# Patient Record
Sex: FEMALE | Race: WHITE | URBAN - METROPOLITAN AREA
[De-identification: names, ages, dates, MRNs, and addresses within clinical notes are randomized per-mention and may not be internally consistent; named-entity substitution may affect disease eponyms.]

---

## 2017-03-13 ENCOUNTER — COMMUNICATION - HEALTHEAST (OUTPATIENT)
Dept: TELEHEALTH | Facility: CLINIC | Age: 62
End: 2017-03-13

## 2017-03-13 ENCOUNTER — OFFICE VISIT - HEALTHEAST (OUTPATIENT)
Dept: FAMILY MEDICINE | Facility: CLINIC | Age: 62
End: 2017-03-13

## 2017-03-13 DIAGNOSIS — R10.9 RIGHT SIDED ABDOMINAL PAIN: ICD-10-CM

## 2017-03-13 DIAGNOSIS — E03.9 HYPOTHYROIDISM: ICD-10-CM

## 2017-03-13 DIAGNOSIS — Z13.9 SCREENING: ICD-10-CM

## 2017-03-13 RX ORDER — BETAMETHASONE DIPROPIONATE 0.5 MG/G
OINTMENT, AUGMENTED TOPICAL 2 TIMES DAILY
Status: SHIPPED | COMMUNITY
Start: 2017-03-13

## 2017-03-13 RX ORDER — CALCIPOTRIENE 50 UG/G
OINTMENT TOPICAL 2 TIMES DAILY
Status: SHIPPED | COMMUNITY
Start: 2017-03-13

## 2017-03-13 ASSESSMENT — MIFFLIN-ST. JEOR: SCORE: 1180.47

## 2017-03-14 ENCOUNTER — COMMUNICATION - HEALTHEAST (OUTPATIENT)
Dept: FAMILY MEDICINE | Facility: CLINIC | Age: 62
End: 2017-03-14

## 2017-04-20 ENCOUNTER — OFFICE VISIT - HEALTHEAST (OUTPATIENT)
Dept: FAMILY MEDICINE | Facility: CLINIC | Age: 62
End: 2017-04-20

## 2017-04-20 ENCOUNTER — COMMUNICATION - HEALTHEAST (OUTPATIENT)
Dept: SCHEDULING | Facility: CLINIC | Age: 62
End: 2017-04-20

## 2017-04-20 DIAGNOSIS — R10.31 RIGHT LOWER QUADRANT ABDOMINAL PAIN: ICD-10-CM

## 2017-04-20 DIAGNOSIS — E03.9 HYPOTHYROIDISM: ICD-10-CM

## 2017-04-20 ASSESSMENT — MIFFLIN-ST. JEOR: SCORE: 1165.05

## 2018-01-02 ENCOUNTER — OFFICE VISIT - HEALTHEAST (OUTPATIENT)
Dept: FAMILY MEDICINE | Facility: CLINIC | Age: 63
End: 2018-01-02

## 2018-01-02 DIAGNOSIS — J32.9 SINUS INFECTION: ICD-10-CM

## 2018-01-02 ASSESSMENT — MIFFLIN-ST. JEOR: SCORE: 1106.99

## 2018-04-10 ENCOUNTER — OFFICE VISIT - HEALTHEAST (OUTPATIENT)
Dept: FAMILY MEDICINE | Facility: CLINIC | Age: 63
End: 2018-04-10

## 2018-04-10 DIAGNOSIS — Z00.00 ANNUAL PHYSICAL EXAM: ICD-10-CM

## 2018-04-10 DIAGNOSIS — E03.9 HYPOTHYROIDISM: ICD-10-CM

## 2018-04-10 DIAGNOSIS — Z00.00 ROUTINE GENERAL MEDICAL EXAMINATION AT A HEALTH CARE FACILITY: ICD-10-CM

## 2018-04-10 LAB
ANION GAP SERPL CALCULATED.3IONS-SCNC: 8 MMOL/L (ref 5–18)
BUN SERPL-MCNC: 12 MG/DL (ref 8–22)
CALCIUM SERPL-MCNC: 9.4 MG/DL (ref 8.5–10.5)
CHLORIDE BLD-SCNC: 103 MMOL/L (ref 98–107)
CO2 SERPL-SCNC: 29 MMOL/L (ref 22–31)
CREAT SERPL-MCNC: 0.78 MG/DL (ref 0.6–1.1)
GFR SERPL CREATININE-BSD FRML MDRD: >60 ML/MIN/1.73M2
GLUCOSE BLD-MCNC: 99 MG/DL (ref 70–125)
HGB BLD-MCNC: 14.2 G/DL (ref 12–16)
POTASSIUM BLD-SCNC: 4.1 MMOL/L (ref 3.5–5)
SODIUM SERPL-SCNC: 140 MMOL/L (ref 136–145)
TSH SERPL DL<=0.005 MIU/L-ACNC: 0.59 UIU/ML (ref 0.3–5)

## 2018-04-10 RX ORDER — LEVOTHYROXINE SODIUM 100 UG/1
100 TABLET ORAL DAILY
Qty: 90 TABLET | Refills: 3 | Status: SHIPPED | OUTPATIENT
Start: 2018-04-10

## 2018-04-10 ASSESSMENT — MIFFLIN-ST. JEOR: SCORE: 1136.93

## 2018-04-11 ENCOUNTER — COMMUNICATION - HEALTHEAST (OUTPATIENT)
Dept: FAMILY MEDICINE | Facility: CLINIC | Age: 63
End: 2018-04-11

## 2021-05-30 VITALS — WEIGHT: 142.4 LBS | BODY MASS INDEX: 25.23 KG/M2 | HEIGHT: 63 IN

## 2021-05-30 VITALS — BODY MASS INDEX: 25.83 KG/M2 | WEIGHT: 145.8 LBS | HEIGHT: 63 IN

## 2021-05-31 VITALS — BODY MASS INDEX: 22.96 KG/M2 | WEIGHT: 129.6 LBS | HEIGHT: 63 IN

## 2021-06-01 VITALS — BODY MASS INDEX: 24.13 KG/M2 | WEIGHT: 136.2 LBS | HEIGHT: 63 IN

## 2021-06-09 NOTE — PROGRESS NOTES
Office Visit - New Patient   Collette J Swanson   61 y.o.  female    Date of visit: 3/13/2017  Physician: Kandy Major CNP     Assessment and Plan   1. Hypothyroidism  Plan check TSH today and adjust levothyroxine dose.  - levothyroxine (SYNTHROID, LEVOTHROID) 100 MCG tablet; Take 1 tablet (100 mcg total) by mouth daily.  Dispense: 90 tablet; Refill: 3  - Thyroid Cascade  - T4, Free    Addend: Start taking levothyroxine 88 mcg daily for the next 6 weeks and recheck TSH.   Spoke with patient and we agreed to continue with the 100 mcg for another 6 weeks and recheck TSH.    2. Screening  Patient is not up to date on her health maintenance. Complete referral for both colonoscopy and mammogram.  - Ambulatory referral for Colonoscopy  - Mammo Screening Bilateral; Future    3. Right sided abdominal pain  Patient currently has no abdominal pain and she has not had this pain for several months. I encourage her to keep diary of her pain and if experiences pain again she should called the clinic for a possible appointment.       The following high BMI interventions were performed this visit: encouragement to exercise and dietary management education, guidance, and counseling    No Follow-up on file.     Chief Complaint   Chief Complaint   Patient presents with     Establish Care     Medication Refill        Patient Profile   Social History     Social History Narrative        Past Medical History   Patient Active Problem List   Diagnosis     Hypothyroidism     Overweight     Hypertension     Gestational Diabetes Mellitus     Psoriasis     Ankle Swelling (On Exam)       Past Surgical History  She has a past surgical history that includes remove tonsils/adenoids,<12 y/o and removal gallbladder.     History of Present Illness   This 61 y.o. old female patient who presents to the clinic to establish care. She was previously seen at health [facility] but due to insurance change she needed to reestablish care with a new PCP.  "Patient has history of hypertension, lower extremity edema and hypothyroidism. She reports that her blood pressure is well controlled with her current medication. Patient reports that she has also had surgery to remove her gallbladder and about a year after her surgery she started experiencing right abdominal pain that is relieved with ibuprofen 400 mg. She states that she did talk to her previous provider about the pain and she informed her that it could be from tissue adhesion post her Cholecystectomy. She has not had any workup for this pain and she has not had pain for few months now. She denied any other concerns today.      Review of Systems: A 12 point comprehensive review of systems was negative except as noted.     Medications and Allergies   Current Outpatient Prescriptions   Medication Sig Dispense Refill     betamethasone, augmented, (DIPROLENE) 0.05 % ointment Apply topically 2 (two) times a day.       calcipotriene (DOVONOX) 0.005 % ointment Apply topically 2 (two) times a day.       levothyroxine (SYNTHROID, LEVOTHROID) 100 MCG tablet Take 1 tablet (100 mcg total) by mouth daily. 90 tablet 3     No current facility-administered medications for this visit.      Allergies   Allergen Reactions     Penicillins         Family and Social History   Family History   Problem Relation Age of Onset     Heart disease Mother      Depression Mother      Hypertension Mother      Diabetes Father      Colon cancer Sister         Social History   Substance Use Topics     Smoking status: Former Smoker     Quit date: 1979     Smokeless tobacco: None     Alcohol use Yes      Comment: once in a while        Physical Exam   General Appearance:  Well groomed.    Visit Vitals     /70     Pulse 84     Ht 5' 3\" (1.6 m)     Wt 145 lb 12.8 oz (66.1 kg)     SpO2 98%     BMI 25.83 kg/m2       EYES: Eyelids, conjunctiva, and sclera were normal. Pupils were normal. Cornea, iris, and lens were normal bilaterally.  HEAD, EARS, " NOSE, MOUTH, AND THROAT: Head and face were normal. Hearing was normal to voice and the ears were normal to external exam. Nose appearance was normal and there was no discharge. Oropharynx was normal.  NECK: Neck appearance was normal. There were no neck masses and the thyroid was not enlarged.  RESPIRATORY: Breathing pattern was normal and the chest moved symmetrically.  Percussion/auscultatory percussion was normal.  Lung sounds were normal and there were no abnormal sounds.  CARDIOVASCULAR: Heart rate and rhythm were normal.  S1 and S2 were normal and there were no extra sounds or murmurs. Peripheral pulses in arms and legs were normal.  Jugular venous pressure was normal.  There was no peripheral edema.  MUSCULOSKELETAL: Skeletal configuration was normal and muscle mass was normal for age. Joint appearance was overall normal.  LYMPHATIC: There were no enlarged nodes.  SKIN/HAIR/NAILS: Skin color was normal.  There were no skin lesions.  Hair and nails were normal.  NEUROLOGIC: The patient was alert and oriented to person, place, time, and circumstance. Speech was normal. Cranial nerves were normal. Motor strength was normal for age. The patient was normally coordinated.  PSYCHIATRIC:  Mood and affect were normal and the patient had normal recent and remote memory. The patient's judgment and insight were normal.       Additional Information     Review and/or order of clinical lab tests: Low TSH    Time: total time spent with the patient was 45 minutes of which >50% was spent in counseling and coordination of care     Kandy Major CNP  Family Nurse Practitioner  [facility]  [phone]  [email or web address]

## 2021-06-10 NOTE — PROGRESS NOTES
"  Office Visit - Follow Up   Collette J Swanson   61 y.o. female    Date of Visit: 4/20/2017    Chief Complaint   Patient presents with     Abdominal Pain     stomach cramp-started at 12:30/No diarrhea or vomitting        Assessment and Plan   1. Right lower quadrant abdominal pain  Patient reports that she contact her insurance and they were not going to pay for a CT scan until she meets her deductible of 1000 dollars. We agreed that since she is able to manage pain with Ibuprofen at this time that she will continue to monitor pain and if it becomes unbearable she will contact the clinic possibly get a CT scan     2. Hypothyroidism  - Thyroid Cascade       History of Present Illness   This 61 y.o. old female patient presents to the clinic today stating that she had the same right lower quadrant abdominal pain she had discussed with in the past today. She said that she was in her kitchen trying to set things up for her weekend event when she started noticing sharp pain in her right lower quadrant. She said she wanted to observe it and see if it will go away but got worse so she took 400 mg of Ibuprofen and then called the clinic to make the appointment. Pain was rated as 9/10 at that time and two hours later pain had gone down to a 0.5. She denied diarrhea, nausea, vomiting, chest pain, dizziness, and chills. She denied pain while she is at the clinic.     Review of Systems: A 12 point comprehensive review of systems was negative except as noted.     Medications, Allergies and Problem List   Reviewed and updated     Physical Exam   General Appearance: well groomed    /76  Pulse 60  Ht 5' 3\" (1.6 m)  Wt 142 lb 6.4 oz (64.6 kg)  SpO2 99%  BMI 25.23 kg/m2  Physical Examination: General appearance - alert, well appearing, and in no distress  Eyes: pupils equal and reactive, extraocular eye movements intact  Mouth: mucous membranes moist, pharynx normal without lesions  Neurological: alert, oriented, normal " speech, no focal findings or movement disorder noted  Extremities: No edema, no clubbing or cyanosis  Psychiatric: Normal affect. Does not appear anxious or depressed.       Additional Information   Current Outpatient Prescriptions   Medication Sig Dispense Refill     betamethasone, augmented, (DIPROLENE) 0.05 % ointment Apply topically 2 (two) times a day.       calcipotriene (DOVONOX) 0.005 % ointment Apply topically 2 (two) times a day.       levothyroxine (SYNTHROID, LEVOTHROID) 100 MCG tablet Take 1 tablet (100 mcg total) by mouth daily. 90 tablet 3     levothyroxine (SYNTHROID) 88 MCG tablet Take 1 tablet (88 mcg total) by mouth daily. 30 tablet 11     No current facility-administered medications for this visit.      Allergies   Allergen Reactions     Penicillins      Social History   Substance Use Topics     Smoking status: Former Smoker     Quit date: 1979     Smokeless tobacco: None     Alcohol use Yes      Comment: once in a while     Time: total time spent with the patient was 15 minutes of which >50% was spent in counseling and coordination of care     Kandy Major, CNP

## 2021-06-15 NOTE — PROGRESS NOTES
"Assessment:   1. Sinus infection  - azithromycin (ZITHROMAX Z-CATRACHITO) 250 MG tablet; Take 2 tablets (500 mg) on  Day 1,  followed by 1 tablet (250 mg) once daily on Days 2 through 5.  Dispense: 6 tablet; Refill: 0  - fluticasone (FLONASE) 50 mcg/actuation nasal spray; 1 spray into each nostril 2 (two) times a day.  Dispense: 16 g; Refill: 2  - sodium chloride 0.65 % Drop; 1 application into each nostril 2 (two) times a day.  Dispense: 30 mL; Refill: 0     Plan:   Nasal saline sprays.  Nasal steroids per medication orders.  Antihistamines per medication orders.  Azithromycin per medication orders.  Follow up in 7 days or as needed.     Subjective:   Collette J Swanson is a 62 y.o. female who presents for evaluation of sinus pain. Symptoms include: congestion, cough, fevers, headaches, nasal congestion, post nasal drip and sinus pressure. Onset of symptoms was 6 days ago. Symptoms have been gradually worsening since that time. Past history is significant for no history of pneumonia or bronchitis. Patient is a non-smoker.    The following portions of the patient's history were reviewed and updated as appropriate: allergies, current medications, past family history, past medical history, past social history, past surgical history and problem list.    Review of Systems  A 12 point comprehensive review of systems was negative except as noted.     Objective:   /76  Pulse 64  Temp 98.6  F (37  C) (Oral)   Ht 5' 3\" (1.6 m)  Wt 129 lb 9.6 oz (58.8 kg)  SpO2 98%  BMI 22.96 kg/m2  General appearance: alert, appears stated age and cooperative  Head: Normocephalic, without obvious abnormality, atraumatic  Eyes: conjunctivae/corneas clear. PERRL, EOM's intact. Fundi benign.  Ears: normal TM's and external ear canals both ears  Nose: brown and green discharge, moderate congestion, turbinates inflamed  Throat: lips, mucosa, and tongue normal; teeth and gums normal  Neck: no adenopathy, no carotid bruit, no JVD, supple, " symmetrical, trachea midline and thyroid not enlarged, symmetric, no tenderness/mass/nodules  Lungs: clear to auscultation bilaterally  Heart: regular rate and rhythm, S1, S2 normal, no murmur, click, rub or gallop  Pulses: 2+ and symmetric  Skin: Skin color, texture, turgor normal. No rashes or lesions  Lymph nodes: Cervical, supraclavicular, and axillary nodes normal.  Neurologic: Grossly normal

## 2021-06-17 NOTE — PROGRESS NOTES
Assessment and Plan:   1. Hypothyroidism  - levothyroxine (SYNTHROID, LEVOTHROID) 100 MCG tablet; Take 1 tablet (100 mcg total) by mouth daily.  Dispense: 90 tablet; Refill: 3  - Thyroid Stimulating Hormone (TSH)    2. Annual physical exam  Healthy female examination  - Thyroid Stimulating Hormone (TSH)  - Basic Metabolic Panel  - Hemoglobin    The patient's current medical problems were reviewed.    I have had an Advance Directives discussion with the patient.  The following high BMI interventions were performed this visit: encouragement to exercise and dietary management education, guidance, and counseling  The following health maintenance schedule was reviewed with the patient and provided in printed form in the after visit summary:   Health Maintenance   Topic Date Due     MAMMOGRAM  1955     PAP SMEAR  08/12/1986     COLONOSCOPY  08/12/2006     ZOSTER VACCINE  08/12/2016     INFLUENZA VACCINE RULE BASED (1) 08/01/2017     TD 18+ HE  02/23/2021     ADVANCE DIRECTIVES DISCUSSED WITH PATIENT  03/13/2022     TDAP ADULT ONE TIME DOSE  Completed        Subjective:   Chief Complaint: Collette J Swanson is an 62 y.o. female here for an Annual Wellness visit.   HPI:  Collette Swanson, 62 years old female presents to the clinic for her annual physical. She has no concerns today.     Review of Systems: Please see above.  The rest of the review of systems are negative for all systems.    Patient Care Team:  MARKEL Grace as PCP - General (Nurse Practitioner)     Patient Active Problem List   Diagnosis     Hypothyroidism     Overweight     Hypertension     Gestational Diabetes Mellitus     Psoriasis     Ankle Swelling (On Exam)     Past Medical History:   Diagnosis Date     Disease of thyroid gland       Past Surgical History:   Procedure Laterality Date     ME REMOVAL GALLBLADDER      Description: Cholecystectomy;  Recorded: 04/24/2014;     ME REMOVE TONSILS/ADENOIDS,<11 Y/O      Description: Tonsillectomy  "With Adenoidectomy;  Recorded: 04/24/2014;      Family History   Problem Relation Age of Onset     Heart disease Mother      Depression Mother      Hypertension Mother      Diabetes Father      Colon cancer Sister       Social History     Social History     Marital status:      Spouse name: N/A     Number of children: N/A     Years of education: N/A     Occupational History     Not on file.     Social History Main Topics     Smoking status: Former Smoker     Quit date: 1979     Smokeless tobacco: Never Used     Alcohol use Yes      Comment: once in a while     Drug use: No     Sexual activity: Yes     Partners: Male     Other Topics Concern     Not on file     Social History Narrative      Current Outpatient Prescriptions   Medication Sig Dispense Refill     ibuprofen (ADVIL,MOTRIN) 600 MG tablet Take 1 tablet (600 mg total) by mouth every 6 (six) hours as needed for pain. 60 tablet 2     levothyroxine (SYNTHROID, LEVOTHROID) 100 MCG tablet Take 1 tablet (100 mcg total) by mouth daily. 90 tablet 3     betamethasone, augmented, (DIPROLENE) 0.05 % ointment Apply topically 2 (two) times a day.       calcipotriene (DOVONOX) 0.005 % ointment Apply topically 2 (two) times a day.       fluticasone (FLONASE) 50 mcg/actuation nasal spray 1 spray into each nostril 2 (two) times a day. 16 g 2     sodium chloride 0.65 % Drop 1 application into each nostril 2 (two) times a day. 30 mL 0     No current facility-administered medications for this visit.       Objective:   Vital Signs:   Visit Vitals     /56     Pulse 76     Ht 5' 3\" (1.6 m)     Wt 136 lb 3.2 oz (61.8 kg)     SpO2 99%     BMI 24.13 kg/m2        VisionScreening:  No exam data present     PHYSICAL EXAM  /56  Pulse 76  Ht 5' 3\" (1.6 m)  Wt 136 lb 3.2 oz (61.8 kg)  SpO2 99%  BMI 24.13 kg/m2  General appearance: alert, appears stated age and cooperative  Head: Normocephalic, without obvious abnormality, atraumatic  Eyes: conjunctivae/corneas clear. " PERRL, EOM's intact. Fundi benign.  Ears: normal TM's and external ear canals both ears  Throat: lips, mucosa, and tongue normal; teeth and gums normal  Neck: no adenopathy, no carotid bruit, no JVD, supple, symmetrical, trachea midline and thyroid not enlarged, symmetric, no tenderness/mass/nodules  Back: symmetric, no curvature. ROM normal. No CVA tenderness.  Lungs: clear to auscultation bilaterally  Heart: regular rate and rhythm, S1, S2 normal, no murmur, click, rub or gallop  Extremities: extremities normal, atraumatic, no cyanosis or edema  Pulses: 2+ and symmetric  Skin: Skin color, texture, turgor normal. No rashes or lesions  Lymph nodes: Cervical, supraclavicular, and axillary nodes normal.  Neurologic: Grossly normal      No flowsheet data found.  A Mini-Cog score of 0-2 suggests the possibility of dementia, score of 3-5 suggests no dementia    Identified Health Risks:

## 2021-08-22 ENCOUNTER — HEALTH MAINTENANCE LETTER (OUTPATIENT)
Age: 66
End: 2021-08-22

## 2021-10-17 ENCOUNTER — HEALTH MAINTENANCE LETTER (OUTPATIENT)
Age: 66
End: 2021-10-17

## 2022-10-02 ENCOUNTER — HEALTH MAINTENANCE LETTER (OUTPATIENT)
Age: 67
End: 2022-10-02

## 2023-02-11 ENCOUNTER — HEALTH MAINTENANCE LETTER (OUTPATIENT)
Age: 68
End: 2023-02-11

## 2023-10-21 ENCOUNTER — HEALTH MAINTENANCE LETTER (OUTPATIENT)
Age: 68
End: 2023-10-21

## 2024-03-09 ENCOUNTER — HEALTH MAINTENANCE LETTER (OUTPATIENT)
Age: 69
End: 2024-03-09